# Patient Record
Sex: FEMALE | URBAN - METROPOLITAN AREA
[De-identification: names, ages, dates, MRNs, and addresses within clinical notes are randomized per-mention and may not be internally consistent; named-entity substitution may affect disease eponyms.]

---

## 2021-08-12 ENCOUNTER — NURSE TRIAGE (OUTPATIENT)
Dept: NURSING | Facility: CLINIC | Age: 26
End: 2021-08-12

## 2021-08-12 NOTE — TELEPHONE ENCOUNTER
"Triage Call    Pt calling with report of intermittent nausea, and continuous lower abdominal & Left abdominal pain at 5/10 on the pain scale for the past 1-2 hours.  Pt says she has been constipated, but has had a BM today.   Denies current fever, difficulty with urination, or vomiting.  Triaged to See PCP within 24 hours, and Care Advice given per Female Abdominal Pain Guideline.    Pt is agreeable to plan but her Father, who initially called, keeps telling pt she needs to go to the ED.    Kamille Mandel RN        Reason for Disposition    [1] MODERATE pain (e.g., interferes with normal activities) AND [2] pain comes and goes (cramps) AND [3] present > 24 hours  (Exception: pain with Vomiting or Diarrhea - see that Guideline)    Additional Information    Negative: Shock suspected (e.g., cold/pale/clammy skin, too weak to stand, low BP, rapid pulse)    Negative: Difficult to awaken or acting confused (e.g., disoriented, slurred speech)    Negative: Passed out (i.e., lost consciousness, collapsed and was not responding)    Negative: Sounds like a life-threatening emergency to the triager    Negative: Chest pain    Negative: Pain is mainly in upper abdomen  (if needed ask: \"is it mainly above the belly button?\")    Negative: Followed an abdomen (stomach) injury    Negative: [1] Abdominal pain AND [2] pregnant < 20 weeks    Negative: [1] Abdominal pain AND [2] pregnant > 20 weeks    Negative: [1] Abdominal pain AND [2] postpartum (from 0 to 6 weeks after delivery)    Negative: [1] SEVERE pain (e.g., excruciating) AND [2] present > 1 hour    Negative: [1] SEVERE pain AND [2] age > 60    Negative: [1] Vomiting AND [2] contains red blood or black (\"coffee ground\") material  (Exception: few red streaks in vomit that only happened once)    Negative: Blood in bowel movements   (Exception: blood on surface of BM with constipation)    Negative: Black or tarry bowel movements  (Exception: chronic-unchanged  black-grey " bowel movements AND is taking iron pills or Pepto-bismol)    Negative: [1] MILD-MODERATE pain AND [2] constant AND [3] present > 2 hours    Negative: [1] Vomiting AND [2] abdomen looks much more swollen than usual    Negative: [1] Vomiting AND [2] contains bile (green color)    Negative: White of the eyes have turned yellow (i.e., jaundice)    Negative: Fever > 103 F (39.4 C)    Negative: [1] Fever > 101 F (38.3 C) AND [2] age > 60    Negative: [1] Fever > 100.0 F (37.8 C) AND [2] bedridden (e.g., nursing home patient, CVA, chronic illness, recovering from surgery)    Negative: [1] Fever > 100.0 F (37.8 C) AND [2] diabetes mellitus or weak immune system (e.g., HIV positive, cancer chemo, splenectomy, organ transplant, chronic steroids)    Negative: [1] SEVERE pain AND [2] present < 1 hour    Protocols used: ABDOMINAL PAIN - FEMALE-A-AH